# Patient Record
(demographics unavailable — no encounter records)

---

## 2024-10-29 NOTE — HISTORY OF PRESENT ILLNESS
[FreeTextEntry1] : 64 y/o F here for initial medical weight management consultation  Medical Hx: T2DM, HTN, HLD, Iron Deficiency, Restless leg syndrome,  Started struggling with weight since childhood, gained 30 lbs since quitting smoking 3 years ago. 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 Duenas

## 2024-10-29 NOTE — PHYSICAL EXAM
[Alert] : alert [Oriented to Person] : oriented to person [Oriented to Place] : oriented to place [Oriented to Time] : oriented to time [de-identified] : no distress noted [de-identified] : Appropriate

## 2024-10-29 NOTE — ASSESSMENT
[FreeTextEntry1] : Dietary Modification Education provided. Recommend a diet high in vegetables intake & lean protein. Recommend limiting simple carbs. The patient to adhere to a  diet, avoid certain foods high in bad fats and consume more fibrous foods. Physical Activity- recommend incorporating exercise slowly at this time. Should start with chair exercises.  The patient is advised to try chair exercises for fitness. Sent info sheet on how to do chair exercises Labs- done recently- reports that last HgBA1c was 6.9%, will send for my review  Medication- pt meets criteria to initiate medication treatment of obesity and diabetes management.  Discussed the option to treat obesity with GLP1 medication. Patient denies contraindications to taking: denies family hx or personal history of medullary thyroid carcinoma or MEN 2, denies history of pancreatitis/gallbladder disease/hypoglycemia/renal impairment/ suicidal ideation.  Discussed mechanism of action. Reviewed side effects including: N/V/D/C, injection site reactions, headaches, low blood sugar, dizziness, abdominal pain, and fatigue. Reviewed ways to decrease nausea including: eating bland/lowfat foods, eating foods that contain water, and avoiding lying flat after eating. Discussed injection education & titration schedule. Start with mounjaro 2.5 mg weekly and re-evaluate in 3 weeks.

## 2024-10-29 NOTE — ADDENDUM
[FreeTextEntry1] :  Documented by Chantal Blackwood acting as a scribe for ELTON MORAN on 10/29/2024.

## 2024-10-29 NOTE — END OF VISIT
[FreeTextEntry3] :   All medical record entries made by the scribe were at my, ELTON MORAN, direction and personally dictated by me on 10/29/2024. I have reviewed the chart and agree that the record accurately reflects my personal performance of the history, physical exam, assessment, and plan. I have also personally directed, reviewed, and agreed with the chart.

## 2024-10-29 NOTE — HISTORY OF PRESENT ILLNESS
[FreeTextEntry1] :  65-year-old female here for evaluation of hemorrhoids. Patient has longstanding history of hemorrhoids but has not previously had any treatment. Over the past week, she has had worsening pain. Reports this morning, she noticed bleeding when going to the bathroom. Patient reports she has tried topical creams without much improvement. She also applied cold compressions which did provide minor relief. Denies pus. Reports a painful lump externally. Patient states she goes to the bathroom everyday. In addition, she reports constipation. She has been prescribed Metamucil which she has yet to use. Had a colonoscopy in August which was normal.

## 2024-12-03 NOTE — HISTORY OF PRESENT ILLNESS
[FreeTextEntry1] : 66 y/o F here for initial medical weight management consultation  Medical Hx: T2DM, HTN, HLD, Iron Deficiency, Restless leg syndrome,  Started struggling with weight since childhood, gained 30 lbs since quitting smoking 3 years ago. 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 Duenas  12/3/24: Lost 11 llbs, feels well overall. Tolerating mounjaro 2.5 mg weekly, reports feeling hernandez quicker. Continues to eat 3 meals per day, but smaller portions, drinking water throughout the day. Exercise limited by back pain.

## 2024-12-03 NOTE — ASSESSMENT
[FreeTextEntry1] : Inc dose of mounjaro 5 mg weekly  Continue to focus on getting adequate water and 3 meals with protein and fiber  Inc activity as tolerated RTO in 1 month for f/u

## 2025-01-09 NOTE — ASSESSMENT
[FreeTextEntry1] : - Summary : Continue current weight management plan with focus on balanced nutrition and hydration. Monitor blood pressure. - Plan : - Continue weight management medication at current dose of 5 mg, Renew prescription for two months - Encourage small, frequent meals with adequate protein intake - Maintain hydration with current water intake - Monitor blood pressure regularly - Follow up in two months

## 2025-01-09 NOTE — HISTORY OF PRESENT ILLNESS
[FreeTextEntry1] : 66 y/o F here for initial medical weight management consultation  Medical Hx: T2DM, HTN, HLD, Iron Deficiency, Restless leg syndrome,  Started struggling with weight since childhood, gained 30 lbs since quitting smoking 3 years ago. 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 Duenas  12/3/24: Lost 11 llbs, feels well overall. Tolerating mounjaro 2.5 mg weekly, reports feeling hernandez quicker. Continues to eat 3 meals per day, but smaller portions, drinking water throughout the day. Exercise limited by back pain.   1/9/25: Feeling well. Happy with progress with weight. Tolerating mounjaro 5 mg weekly, feels hernandez with smaller portions. Eats 3 small meals per day. Drinking 40 ounces of water per day. Was sick with the flu recently and hospitalized for it, but feels healthy now. Forgot to take blood pressure medicine this morning, will take later today.

## 2025-03-13 NOTE — ASSESSMENT
[FreeTextEntry1] : - Summary : Plan is to maintain current treatment regimen, increase hydration, and consider additional interventions for back pain. - Plan : - Continue with current weight loss regimen- Mounjaro 5 mg weekly  - Increase intake of water especially during the day - Consult with back specialist for potential physical therapy - Return appointment in two months

## 2025-03-13 NOTE — HISTORY OF PRESENT ILLNESS
[FreeTextEntry1] : 64 y/o F here for initial medical weight management consultation  Medical Hx: T2DM, HTN, HLD, Iron Deficiency, Restless leg syndrome,  Started struggling with weight since childhood, gained 30 lbs since quitting smoking 3 years ago. 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 Duenas  12/3/24: Lost 11 llbs, feels well overall. Tolerating mounjaro 2.5 mg weekly, reports feeling hernandez quicker. Continues to eat 3 meals per day, but smaller portions, drinking water throughout the day. Exercise limited by back pain.   1/9/25: Feeling well. Happy with progress with weight. Tolerating mounjaro 5 mg weekly, feels hernandez with smaller portions. Eats 3 small meals per day. Drinking 40 ounces of water per day. Was sick with the flu recently and hospitalized for it, but feels healthy now. Forgot to take blood pressure medicine this morning, will take later today.   3/13/25: The patient reports successful weight loss, improved cholesterol levels, and Hemoglobin A1C levels of 5.8- labs done 3/4/25 at [facility]- cardiology. The patient also reports experiencing heartburn after injections and increased hunger towards the end of each week. She also continues to stay active despite some limitations due to back and knee pain. Sees Dr. Lopez for back pain. Food recall- B- banana, L- skipped, D- chicken with steamed vegetables, Snack- fruit. Water intake- about 40 ounces per day.

## 2025-05-01 NOTE — ASSESSMENT
[FreeTextEntry1] : - Summary : Plan to increase medication dose to address increased hunger and continue weight management. - Plan : - Increase medication dose to 7.5 mg next month - Continue current treatment plan until dose increase - Patient advised to focus on getting enough water plus three meals of protein and fiber to help feel full throughout the day - Provided medical note for upcoming travel to allow carrying medication in carry-on luggage- for Aug, supply at next apt  - Follow-up appointment scheduled in 2 months

## 2025-05-01 NOTE — HISTORY OF PRESENT ILLNESS
[FreeTextEntry1] : 64 y/o F here for initial medical weight management consultation  Medical Hx: T2DM, HTN, HLD, Iron Deficiency, Restless leg syndrome,  Started struggling with weight since childhood, gained 30 lbs since quitting smoking 3 years ago. 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 Duenas  12/3/24: Lost 11 llbs, feels well overall. Tolerating mounjaro 2.5 mg weekly, reports feeling hernandez quicker. Continues to eat 3 meals per day, but smaller portions, drinking water throughout the day. Exercise limited by back pain.   1/9/25: Feeling well. Happy with progress with weight. Tolerating mounjaro 5 mg weekly, feels hernandez with smaller portions. Eats 3 small meals per day. Drinking 40 ounces of water per day. Was sick with the flu recently and hospitalized for it, but feels healthy now. Forgot to take blood pressure medicine this morning, will take later today.   3/13/25: The patient reports successful weight loss, improved cholesterol levels, and Hemoglobin A1C levels of 5.8- labs done 3/4/25 at [facility]- cardiology. The patient also reports experiencing heartburn after injections and increased hunger towards the end of each week. She also continues to stay active despite some limitations due to back and knee pain. Sees Dr. Lopez for back pain. Food recall- B- banana, L- skipped, D- chicken with steamed vegetables, Snack- fruit. Water intake- about 40 ounces per day.   5/1/25: Tolerating mounjaro 5 mg well. Patient reports feeling hungrier, especially at nighttime before bed. Appetite during the day is okay.  Focuses on getting adequate water throughout the day . Continues to stay active. Labs done in March- CMP WNL. Plans on seeing cardiologist this summer and having HgBA1c checked before next apt.